# Patient Record
Sex: FEMALE | Race: BLACK OR AFRICAN AMERICAN | NOT HISPANIC OR LATINO | URBAN - METROPOLITAN AREA
[De-identification: names, ages, dates, MRNs, and addresses within clinical notes are randomized per-mention and may not be internally consistent; named-entity substitution may affect disease eponyms.]

---

## 2019-08-29 ENCOUNTER — EMERGENCY (EMERGENCY)
Facility: HOSPITAL | Age: 34
LOS: 1 days | Discharge: HOME | End: 2019-08-29
Attending: EMERGENCY MEDICINE | Admitting: EMERGENCY MEDICINE
Payer: COMMERCIAL

## 2019-08-29 VITALS
HEART RATE: 60 BPM | TEMPERATURE: 98 F | OXYGEN SATURATION: 100 % | RESPIRATION RATE: 16 BRPM | DIASTOLIC BLOOD PRESSURE: 85 MMHG | SYSTOLIC BLOOD PRESSURE: 130 MMHG

## 2019-08-29 VITALS
WEIGHT: 273.15 LBS | OXYGEN SATURATION: 100 % | TEMPERATURE: 98 F | HEART RATE: 61 BPM | SYSTOLIC BLOOD PRESSURE: 108 MMHG | DIASTOLIC BLOOD PRESSURE: 68 MMHG | RESPIRATION RATE: 16 BRPM | HEIGHT: 62 IN

## 2019-08-29 DIAGNOSIS — R11.0 NAUSEA: ICD-10-CM

## 2019-08-29 DIAGNOSIS — Z90.721 ACQUIRED ABSENCE OF OVARIES, UNILATERAL: ICD-10-CM

## 2019-08-29 DIAGNOSIS — R10.10 UPPER ABDOMINAL PAIN, UNSPECIFIED: ICD-10-CM

## 2019-08-29 DIAGNOSIS — Z90.79 ACQUIRED ABSENCE OF OTHER GENITAL ORGAN(S): Chronic | ICD-10-CM

## 2019-08-29 LAB
ALBUMIN SERPL ELPH-MCNC: 3.7 G/DL — SIGNIFICANT CHANGE UP (ref 3.3–5)
ALP SERPL-CCNC: 75 U/L — SIGNIFICANT CHANGE UP (ref 40–120)
ALT FLD-CCNC: 10 U/L — SIGNIFICANT CHANGE UP (ref 10–45)
ANION GAP SERPL CALC-SCNC: 9 MMOL/L — SIGNIFICANT CHANGE UP (ref 5–17)
APPEARANCE UR: CLEAR — SIGNIFICANT CHANGE UP
AST SERPL-CCNC: 18 U/L — SIGNIFICANT CHANGE UP (ref 10–40)
BASOPHILS # BLD AUTO: 0.02 K/UL — SIGNIFICANT CHANGE UP (ref 0–0.2)
BASOPHILS NFR BLD AUTO: 0.4 % — SIGNIFICANT CHANGE UP (ref 0–2)
BILIRUB SERPL-MCNC: 0.2 MG/DL — SIGNIFICANT CHANGE UP (ref 0.2–1.2)
BILIRUB UR-MCNC: NEGATIVE — SIGNIFICANT CHANGE UP
BUN SERPL-MCNC: 11 MG/DL — SIGNIFICANT CHANGE UP (ref 7–23)
CALCIUM SERPL-MCNC: 8.8 MG/DL — SIGNIFICANT CHANGE UP (ref 8.4–10.5)
CHLORIDE SERPL-SCNC: 108 MMOL/L — SIGNIFICANT CHANGE UP (ref 96–108)
CO2 SERPL-SCNC: 24 MMOL/L — SIGNIFICANT CHANGE UP (ref 22–31)
COLOR SPEC: YELLOW — SIGNIFICANT CHANGE UP
CREAT SERPL-MCNC: 0.78 MG/DL — SIGNIFICANT CHANGE UP (ref 0.5–1.3)
DIFF PNL FLD: ABNORMAL
EOSINOPHIL # BLD AUTO: 0.13 K/UL — SIGNIFICANT CHANGE UP (ref 0–0.5)
EOSINOPHIL NFR BLD AUTO: 2.3 % — SIGNIFICANT CHANGE UP (ref 0–6)
GLUCOSE SERPL-MCNC: 79 MG/DL — SIGNIFICANT CHANGE UP (ref 70–99)
GLUCOSE UR QL: NEGATIVE — SIGNIFICANT CHANGE UP
HCT VFR BLD CALC: 33.5 % — LOW (ref 34.5–45)
HGB BLD-MCNC: 9.9 G/DL — LOW (ref 11.5–15.5)
IMM GRANULOCYTES NFR BLD AUTO: 0.2 % — SIGNIFICANT CHANGE UP (ref 0–1.5)
KETONES UR-MCNC: NEGATIVE — SIGNIFICANT CHANGE UP
LEUKOCYTE ESTERASE UR-ACNC: NEGATIVE — SIGNIFICANT CHANGE UP
LIDOCAIN IGE QN: 33 U/L — SIGNIFICANT CHANGE UP (ref 7–60)
LYMPHOCYTES # BLD AUTO: 1.78 K/UL — SIGNIFICANT CHANGE UP (ref 1–3.3)
LYMPHOCYTES # BLD AUTO: 31.5 % — SIGNIFICANT CHANGE UP (ref 13–44)
MCHC RBC-ENTMCNC: 23.5 PG — LOW (ref 27–34)
MCHC RBC-ENTMCNC: 29.6 GM/DL — LOW (ref 32–36)
MCV RBC AUTO: 79.4 FL — LOW (ref 80–100)
MONOCYTES # BLD AUTO: 0.5 K/UL — SIGNIFICANT CHANGE UP (ref 0–0.9)
MONOCYTES NFR BLD AUTO: 8.8 % — SIGNIFICANT CHANGE UP (ref 2–14)
NEUTROPHILS # BLD AUTO: 3.21 K/UL — SIGNIFICANT CHANGE UP (ref 1.8–7.4)
NEUTROPHILS NFR BLD AUTO: 56.8 % — SIGNIFICANT CHANGE UP (ref 43–77)
NITRITE UR-MCNC: NEGATIVE — SIGNIFICANT CHANGE UP
NRBC # BLD: 0 /100 WBCS — SIGNIFICANT CHANGE UP (ref 0–0)
PH UR: 5.5 — SIGNIFICANT CHANGE UP (ref 5–8)
PLATELET # BLD AUTO: 375 K/UL — SIGNIFICANT CHANGE UP (ref 150–400)
POTASSIUM SERPL-MCNC: 4.6 MMOL/L — SIGNIFICANT CHANGE UP (ref 3.5–5.3)
POTASSIUM SERPL-SCNC: 4.6 MMOL/L — SIGNIFICANT CHANGE UP (ref 3.5–5.3)
PROT SERPL-MCNC: 7.5 G/DL — SIGNIFICANT CHANGE UP (ref 6–8.3)
PROT UR-MCNC: NEGATIVE MG/DL — SIGNIFICANT CHANGE UP
RBC # BLD: 4.22 M/UL — SIGNIFICANT CHANGE UP (ref 3.8–5.2)
RBC # FLD: 17 % — HIGH (ref 10.3–14.5)
SODIUM SERPL-SCNC: 141 MMOL/L — SIGNIFICANT CHANGE UP (ref 135–145)
SP GR SPEC: 1.02 — SIGNIFICANT CHANGE UP (ref 1–1.03)
UROBILINOGEN FLD QL: 0.2 E.U./DL — SIGNIFICANT CHANGE UP
WBC # BLD: 5.65 K/UL — SIGNIFICANT CHANGE UP (ref 3.8–10.5)
WBC # FLD AUTO: 5.65 K/UL — SIGNIFICANT CHANGE UP (ref 3.8–10.5)

## 2019-08-29 PROCEDURE — 36415 COLL VENOUS BLD VENIPUNCTURE: CPT

## 2019-08-29 PROCEDURE — 81001 URINALYSIS AUTO W/SCOPE: CPT

## 2019-08-29 PROCEDURE — 85025 COMPLETE CBC W/AUTO DIFF WBC: CPT

## 2019-08-29 PROCEDURE — 80053 COMPREHEN METABOLIC PANEL: CPT

## 2019-08-29 PROCEDURE — 83690 ASSAY OF LIPASE: CPT

## 2019-08-29 PROCEDURE — 99284 EMERGENCY DEPT VISIT MOD MDM: CPT

## 2019-08-29 PROCEDURE — 99283 EMERGENCY DEPT VISIT LOW MDM: CPT

## 2019-08-29 RX ORDER — ONDANSETRON 8 MG/1
4 TABLET, FILM COATED ORAL ONCE
Refills: 0 | Status: COMPLETED | OUTPATIENT
Start: 2019-08-29 | End: 2019-08-29

## 2019-08-29 RX ORDER — FAMOTIDINE 10 MG/ML
20 INJECTION INTRAVENOUS ONCE
Refills: 0 | Status: COMPLETED | OUTPATIENT
Start: 2019-08-29 | End: 2019-08-29

## 2019-08-29 RX ORDER — FAMOTIDINE 10 MG/ML
1 INJECTION INTRAVENOUS
Qty: 30 | Refills: 0
Start: 2019-08-29 | End: 2019-09-27

## 2019-08-29 RX ORDER — LIDOCAINE 4 G/100G
10 CREAM TOPICAL ONCE
Refills: 0 | Status: COMPLETED | OUTPATIENT
Start: 2019-08-29 | End: 2019-08-29

## 2019-08-29 RX ADMIN — ONDANSETRON 4 MILLIGRAM(S): 8 TABLET, FILM COATED ORAL at 13:08

## 2019-08-29 RX ADMIN — Medication 30 MILLILITER(S): at 13:08

## 2019-08-29 RX ADMIN — LIDOCAINE 10 MILLILITER(S): 4 CREAM TOPICAL at 13:08

## 2019-08-29 RX ADMIN — FAMOTIDINE 20 MILLIGRAM(S): 10 INJECTION INTRAVENOUS at 13:08

## 2019-08-29 NOTE — ED PROVIDER NOTE - CARDIAC, MLM
Assumed care of pt. A/Ox4. PIV in right hand 20g, saline locked and flushing well. Regular diet, dysphagia 3 with thins. BM 5/10. 2 assist due to hypotension. Bed alarm on. POC [facility] v. Home. Call light in reach, bed in low position, will continue hourly rounding.    Normal rate, regular rhythm.  Heart sounds S1, S2.  No murmurs, rubs or gallops.

## 2019-08-29 NOTE — ED PROVIDER NOTE - PHYSICAL EXAMINATION
General: Patient is well developed and well nourised. Patient is alert and oriented to person, place and date. Patient is laying comfortably in stretcher and appears in no acute distress.  HEENT: Head is normocephalic and atraumatic. Pupils are equal, round and reactive. Extraocular movements intact. No evidence of nystagmus, conjunctival injection, or scleral icterus. External ears symmetric without evidence of discharge.  Nose is symmetric, non-tender, patent without evidence of discharge. Teeth in good repair. Uvula midline.   Neck: Supple with no evidence of lymphadenopathy.  Full range of motion.  Heart: Regular rate and rhythm. No murmurs, rubs or gallops.   Lungs: Clear to auscultation bilaterally with equal chest expansion. No note of wheezes, rhonchi, rales. Equal chest expansion. No note of retractions.  Abdomen: ttp epigastric region. Bowel sounds present in all four quadrants. Soft, non-tender, non-distended without signs of masses, rebound or guarding. No note of hepatosplenomegaly. No CVA tenderness bilaterally. Negative Munoz sign. No pain present over McBurney's point.  Skin: Warm, dry and intact without evidence of rashes, bruising, pallor, jaundice or cyanosis.   Psych: Mood and affect appropriate.

## 2019-08-29 NOTE — ED PROVIDER NOTE - NSFOLLOWUPINSTRUCTIONS_ED_ALL_ED_FT
please eat bland foods- avoid spicy foods and red sauces    please take famotidine and maalox (over the counter medications)for your abdominal pain    please follow up with your primary care doctor            EPIGASTRIC PAIN - General Information     Epigastric Pain    WHAT YOU NEED TO KNOW:    What do I need to know about epigastric pain? Epigastric pain is felt in the middle of the upper abdomen, between the ribs and the bellybutton. The pain may be mild or severe. Pain may spread from or to another part of your body. Epigastric pain may be a sign of a serious health problem that needs to be treated.     What causes epigastric pain? The cause of your pain may not be known. The following are common causes:     Inflammation of your stomach, liver, pancreas, or intestines      Heart problems, such as a heart attack      Digestion problems, such as indigestion, GERD, or lactose intolerance      Medical conditions, such as an ulcer, a hernia, irritable bowel syndrome (IBS), or cancer      A blockage in your bowels or gallbladder      A bladder infection      An injury or previous surgery in your abdomen    What other signs and symptoms may I have with epigastric pain? Signs and symptoms will depend on what is causing your pain.    Nausea, vomiting, bloating, constipation, or diarrhea      Loss of appetite, weight loss, feeling of fullness as you start to eat      Movement relieves the pain or makes it worse, or only certain positions are comfortable      Pain when you eat, or pain that is relieved when you eat or have a bowel movement      Sore throat or a hoarse voice    How is epigastric pain diagnosed and treated? Your healthcare provider will feel your abdomen to see if it is tender or rigid. He may change or stop any medicine you are taking that is causing your pain. Your pain may go away without treatment, or you may need any of the following:     Medicines may be given to treat pain or stop vomiting. You may also need medicines to reduce or control stomach acid, or treat an infection.      Blood or urine tests may show problems such as infection or inflammation. The tests may also show how well your liver is working.      An x-ray is used to check your kidneys and bladder.      An ultrasound is used to check your gallbladder for stones or other blockage.      A bowel movement sample may be tested for blood.     How can I manage my symptoms?     Keep a record of your symptoms. Include when the pain starts, how long it lasts, and if it is sharp or dull. Also include any foods you ate or activities you did before the pain started. Keep track of anything that helped the pain.       Eat a variety of healthy foods. Healthy foods include fruits, vegetables, whole-grain breads, low-fat dairy products, beans, lean meats, and fish. Ask if you need to be on a special diet. Certain foods may cause your pain, such as alcohol or foods that are high in fat. You may need to eat smaller meals and to eat more often than usual.      Drink liquids as directed. Ask how much liquid to drink each day and which liquids are best for you. Do not have drinks that contain alcohol or caffeine.    Call 911 for any of the following:     You have any of the following signs of a heart attack:   Squeezing, pressure, or pain in your chest      You may also have any of the following:   Discomfort or pain in your back, neck, jaw, stomach, or arm      Shortness of breath      Nausea or vomiting      Lightheadedness or a sudden cold sweat      You have severe pain that radiates to your jaw or back.    When should I seek immediate care?     You have severe pain that starts suddenly and quickly gets worse.      You cannot have a bowel movement and are vomiting.      You vomit or cough up blood.      You see blood in your urine or bowel movement.      You feel drowsy and your breathing is slower than usual.    When should I contact my healthcare provider?     You have a fever or chills.      You have yellowing of your skin or the whites of your eyes.      You vomit often or several times in a row.       You lose weight without trying.      You have symptoms for longer than 2 weeks.      You have questions or concerns about your condition or care.    CARE AGREEMENT:    You have the right to help plan your care. Learn about your health condition and how it may be treated. Discuss treatment options with your healthcare providers to decide what care you want to receive. You always have the right to refuse treatment.        © Copyright [employer] 2019 All illustrations and images included in CareNotes are the copyrighted property of [employer] or [employer].      back to top                      © Copyright [employer] 2019

## 2019-08-29 NOTE — ED ADULT NURSE NOTE - OBJECTIVE STATEMENT
Pt. c/o constant upper abd pain w/ on/off nausea x 1 month, worsening x 2 days. Pt. describes pain as "burning" "cramping". Denies any vomiting or diarrhea, last BM last night was regular. Pt. also reports passing less gas. Denies fever, chills, body aches. Denies urinary Sx. Currently on menstrual cycle. Pt. last took naproxen yesterday (for uterine fibroids) w/ no abd pain relief. Pt. also reports chronic b/l knee pain, ambulating independently with steady gait.

## 2019-08-29 NOTE — ED PROVIDER NOTE - ATTENDING CONTRIBUTION TO CARE
34F no PMH p/w fluctuating upper abd pain x1mo, minimal occasional nausea, no other systemic symptoms. Vitals wnl, exam as above.   ddx: Likely GERD/gastritis/PUD less likely pancreatitis or cholelithiasis. clinically not cholecystitis or cardiac/pulm origin.  Basic labs, symptom control, reassess.

## 2019-08-29 NOTE — ED PROVIDER NOTE - PATIENT PORTAL LINK FT
You can access the FollowMyHealth Patient Portal offered by [facility] by registering at the following website: [email or web address]. By joining [employer]’s FollowMyHealth portal, you will also be able to view your health information using other applications (apps) compatible with our system.

## 2019-08-29 NOTE — ED PROVIDER NOTE - CLINICAL SUMMARY MEDICAL DECISION MAKING FREE TEXT BOX
35 y/o female presents to the ED with 1 month waxing and waning abd pain. No vomit, fever, chills, nausea, diarrhea. PE: pt appears well non toxic, mild tenderness to epigastric region. Will obtain labs and meds 33 y/o female presents to the ED with 1 month waxing and waning abd pain. No vomit, fever, chills, nausea, diarrhea. PE: pt appears well non toxic, mild tenderness to epigastric region. Will obtain labs and meds. re-valuation patient states that she is feeling better, abdomen is soft. encouraged maalox and famotidine and follow up with pmd. At this time, the evidence for any other entities in the differential is insufficient to justify any further testing. This was discussed and explained to the patient. It was advised that persistent or worsening symptoms would require further evaluation. This was discussed with the patient and family using shared decision making. ED evaluation and management discussed with the patient and family (if available) in detail.  Close PMD follow up encouraged.  Strict ED return instructions discussed in detail and patient given the opportunity to ask any questions about their discharge diagnosis and instructions. Patient verbalized understanding. Patient is agreeable to plan.

## 2019-08-29 NOTE — ED PROVIDER NOTE - PROGRESS NOTE DETAILS
Klepfish: labs grossly wnl, pain improving, abd soft ntnd, comfortable for dc, outpt pmd g/i f/u. Klepfish: slightly anemic, other labs grossly wnl, pain improving, abd soft ntnd, comfortable for dc, outpt pmd g/i f/u.

## 2019-08-29 NOTE — ED PROVIDER NOTE - OBJECTIVE STATEMENT
35 y/o F  presents with 1 month waxing and waning of upper abd pain. Pt states abd pain feels like it is "bubbling." Pt endorses nausea.  Pt does not endorse fever, chills, diarrhea, CP, cough, SOB, dysuria. All other ROS negative. 35 y/o F  presents with 1 month waxing and waning of upper abd pain. Pt states abd pain feels like it is "bubbling." Pt endorses nausea.  Pt does not endorse fever, chills, diarrhea, CP, cough, SOB, dysuria. All other ROS negative.  Klepfish: 34F no PMH p/w epigastric pain, x1mo, constant but waxing/waning, non-radiating, no exacerbating/alleviating factors, non-exertional. Slightly worse since yesterday so came to ED. Minimal occasional nausea, no vomit. Denies f/c, diarrhea, black/bloody stool, urinary complaints, focal weakness/numbness, lightheadedness, back pain, rashes, recent travel, recent antibiotics, sick contacts, SOB/CP, URI symptoms. Normal PO intake, normal BMs. 33 y/o F  presents with 1 month waxing and waning of upper abd pain. Pt states abd pain feels like it is "bubbling." Pt endorses nausea, no vomiting. states that she enjoys eating spicy foods unsure if this is worsening of symptoms. Pt does not endorse fever, chills, diarrhea, CP, cough, SOB, dysuria. All other ROS negative.    Klepfish: 34F no PMH p/w epigastric pain, x1mo, constant but waxing/waning, non-radiating, no exacerbating/alleviating factors, non-exertional. Slightly worse since yesterday so came to ED. Minimal occasional nausea, no vomit. Denies f/c, diarrhea, black/bloody stool, urinary complaints, focal weakness/numbness, lightheadedness, back pain, rashes, recent travel, recent antibiotics, sick contacts, SOB/CP, URI symptoms. Normal PO intake, normal BMs.

## 2021-07-06 ENCOUNTER — EMERGENCY (EMERGENCY)
Facility: HOSPITAL | Age: 36
LOS: 1 days | Discharge: HOME | End: 2021-07-06
Attending: STUDENT IN AN ORGANIZED HEALTH CARE EDUCATION/TRAINING PROGRAM | Admitting: STUDENT IN AN ORGANIZED HEALTH CARE EDUCATION/TRAINING PROGRAM
Payer: MEDICAID

## 2021-07-06 VITALS
HEART RATE: 61 BPM | SYSTOLIC BLOOD PRESSURE: 125 MMHG | RESPIRATION RATE: 16 BRPM | TEMPERATURE: 98 F | DIASTOLIC BLOOD PRESSURE: 78 MMHG | OXYGEN SATURATION: 100 %

## 2021-07-06 VITALS
TEMPERATURE: 99 F | DIASTOLIC BLOOD PRESSURE: 83 MMHG | OXYGEN SATURATION: 98 % | RESPIRATION RATE: 16 BRPM | SYSTOLIC BLOOD PRESSURE: 157 MMHG | HEART RATE: 67 BPM

## 2021-07-06 DIAGNOSIS — Z90.79 ACQUIRED ABSENCE OF OTHER GENITAL ORGAN(S): Chronic | ICD-10-CM

## 2021-07-06 LAB
ALBUMIN SERPL ELPH-MCNC: 3.7 G/DL — SIGNIFICANT CHANGE UP (ref 3.3–5)
ALP SERPL-CCNC: 76 U/L — SIGNIFICANT CHANGE UP (ref 40–120)
ALT FLD-CCNC: 8 U/L — SIGNIFICANT CHANGE UP (ref 4–33)
ANION GAP SERPL CALC-SCNC: 9 MMOL/L — SIGNIFICANT CHANGE UP (ref 7–14)
APPEARANCE UR: ABNORMAL
AST SERPL-CCNC: 14 U/L — SIGNIFICANT CHANGE UP (ref 4–32)
BASE EXCESS BLDV CALC-SCNC: 0.2 MMOL/L — SIGNIFICANT CHANGE UP (ref -3–2)
BASOPHILS # BLD AUTO: 0.03 K/UL — SIGNIFICANT CHANGE UP (ref 0–0.2)
BASOPHILS NFR BLD AUTO: 0.5 % — SIGNIFICANT CHANGE UP (ref 0–2)
BILIRUB SERPL-MCNC: 0.3 MG/DL — SIGNIFICANT CHANGE UP (ref 0.2–1.2)
BILIRUB UR-MCNC: NEGATIVE — SIGNIFICANT CHANGE UP
BLOOD GAS VENOUS - CREATININE: SIGNIFICANT CHANGE UP MG/DL (ref 0.5–1.3)
BLOOD GAS VENOUS COMPREHENSIVE RESULT: SIGNIFICANT CHANGE UP
BUN SERPL-MCNC: 13 MG/DL — SIGNIFICANT CHANGE UP (ref 7–23)
CALCIUM SERPL-MCNC: 8.8 MG/DL — SIGNIFICANT CHANGE UP (ref 8.4–10.5)
CHLORIDE BLDV-SCNC: 111 MMOL/L — HIGH (ref 96–108)
CHLORIDE SERPL-SCNC: 108 MMOL/L — HIGH (ref 98–107)
CO2 SERPL-SCNC: 21 MMOL/L — LOW (ref 22–31)
COLOR SPEC: SIGNIFICANT CHANGE UP
CREAT SERPL-MCNC: 0.84 MG/DL — SIGNIFICANT CHANGE UP (ref 0.5–1.3)
DIFF PNL FLD: ABNORMAL
EOSINOPHIL # BLD AUTO: 0.16 K/UL — SIGNIFICANT CHANGE UP (ref 0–0.5)
EOSINOPHIL NFR BLD AUTO: 2.7 % — SIGNIFICANT CHANGE UP (ref 0–6)
EPI CELLS # UR: SIGNIFICANT CHANGE UP /HPF (ref 0–5)
GAS PNL BLDV: 141 MMOL/L — SIGNIFICANT CHANGE UP (ref 136–146)
GLUCOSE BLDV-MCNC: 81 MG/DL — SIGNIFICANT CHANGE UP (ref 70–99)
GLUCOSE SERPL-MCNC: 82 MG/DL — SIGNIFICANT CHANGE UP (ref 70–99)
GLUCOSE UR QL: NEGATIVE — SIGNIFICANT CHANGE UP
HCG SERPL-ACNC: <5 MIU/ML — SIGNIFICANT CHANGE UP
HCO3 BLDV-SCNC: 24 MMOL/L — SIGNIFICANT CHANGE UP (ref 20–27)
HCT VFR BLD CALC: 35.9 % — SIGNIFICANT CHANGE UP (ref 34.5–45)
HCT VFR BLDA CALC: 33.7 % — LOW (ref 34.5–46.5)
HGB BLD CALC-MCNC: 10.9 G/DL — LOW (ref 11.5–15.5)
HGB BLD-MCNC: 11.5 G/DL — SIGNIFICANT CHANGE UP (ref 11.5–15.5)
HIV 1+2 AB+HIV1 P24 AG SERPL QL IA: SIGNIFICANT CHANGE UP
IANC: 3.57 K/UL — SIGNIFICANT CHANGE UP (ref 1.5–8.5)
IMM GRANULOCYTES NFR BLD AUTO: 0.3 % — SIGNIFICANT CHANGE UP (ref 0–1.5)
KETONES UR-MCNC: NEGATIVE — SIGNIFICANT CHANGE UP
LACTATE BLDV-MCNC: 1.3 MMOL/L — SIGNIFICANT CHANGE UP (ref 0.5–2)
LEUKOCYTE ESTERASE UR-ACNC: NEGATIVE — SIGNIFICANT CHANGE UP
LIDOCAIN IGE QN: 81 U/L — HIGH (ref 7–60)
LYMPHOCYTES # BLD AUTO: 1.51 K/UL — SIGNIFICANT CHANGE UP (ref 1–3.3)
LYMPHOCYTES # BLD AUTO: 25.4 % — SIGNIFICANT CHANGE UP (ref 13–44)
MCHC RBC-ENTMCNC: 27.9 PG — SIGNIFICANT CHANGE UP (ref 27–34)
MCHC RBC-ENTMCNC: 32 GM/DL — SIGNIFICANT CHANGE UP (ref 32–36)
MCV RBC AUTO: 87.1 FL — SIGNIFICANT CHANGE UP (ref 80–100)
MONOCYTES # BLD AUTO: 0.65 K/UL — SIGNIFICANT CHANGE UP (ref 0–0.9)
MONOCYTES NFR BLD AUTO: 10.9 % — SIGNIFICANT CHANGE UP (ref 2–14)
NEUTROPHILS # BLD AUTO: 3.57 K/UL — SIGNIFICANT CHANGE UP (ref 1.8–7.4)
NEUTROPHILS NFR BLD AUTO: 60.2 % — SIGNIFICANT CHANGE UP (ref 43–77)
NITRITE UR-MCNC: NEGATIVE — SIGNIFICANT CHANGE UP
NRBC # BLD: 0 /100 WBCS — SIGNIFICANT CHANGE UP
NRBC # FLD: 0 K/UL — SIGNIFICANT CHANGE UP
PCO2 BLDV: 52 MMHG — HIGH (ref 41–51)
PH BLDV: 7.32 — SIGNIFICANT CHANGE UP (ref 7.32–7.43)
PH UR: 6 — SIGNIFICANT CHANGE UP (ref 5–8)
PLATELET # BLD AUTO: 317 K/UL — SIGNIFICANT CHANGE UP (ref 150–400)
PO2 BLDV: 41 MMHG — HIGH (ref 35–40)
POTASSIUM BLDV-SCNC: 4 MMOL/L — SIGNIFICANT CHANGE UP (ref 3.4–4.5)
POTASSIUM SERPL-MCNC: 4.3 MMOL/L — SIGNIFICANT CHANGE UP (ref 3.5–5.3)
POTASSIUM SERPL-SCNC: 4.3 MMOL/L — SIGNIFICANT CHANGE UP (ref 3.5–5.3)
PROT SERPL-MCNC: 7.1 G/DL — SIGNIFICANT CHANGE UP (ref 6–8.3)
PROT UR-MCNC: ABNORMAL
RBC # BLD: 4.12 M/UL — SIGNIFICANT CHANGE UP (ref 3.8–5.2)
RBC # FLD: 13.2 % — SIGNIFICANT CHANGE UP (ref 10.3–14.5)
RBC CASTS # UR COMP ASSIST: SIGNIFICANT CHANGE UP /HPF (ref 0–4)
SAO2 % BLDV: 73.2 % — SIGNIFICANT CHANGE UP (ref 60–85)
SODIUM SERPL-SCNC: 138 MMOL/L — SIGNIFICANT CHANGE UP (ref 135–145)
SP GR SPEC: >1.05 (ref 1.01–1.02)
UROBILINOGEN FLD QL: SIGNIFICANT CHANGE UP
WBC # BLD: 5.94 K/UL — SIGNIFICANT CHANGE UP (ref 3.8–10.5)
WBC # FLD AUTO: 5.94 K/UL — SIGNIFICANT CHANGE UP (ref 3.8–10.5)

## 2021-07-06 PROCEDURE — 71046 X-RAY EXAM CHEST 2 VIEWS: CPT | Mod: 26

## 2021-07-06 PROCEDURE — 74177 CT ABD & PELVIS W/CONTRAST: CPT | Mod: 26

## 2021-07-06 PROCEDURE — 99285 EMERGENCY DEPT VISIT HI MDM: CPT

## 2021-07-06 PROCEDURE — 76830 TRANSVAGINAL US NON-OB: CPT | Mod: 26

## 2021-07-06 RX ORDER — MORPHINE SULFATE 50 MG/1
4 CAPSULE, EXTENDED RELEASE ORAL ONCE
Refills: 0 | Status: DISCONTINUED | OUTPATIENT
Start: 2021-07-06 | End: 2021-07-06

## 2021-07-06 RX ORDER — OXYCODONE AND ACETAMINOPHEN 5; 325 MG/1; MG/1
1 TABLET ORAL ONCE
Refills: 0 | Status: DISCONTINUED | OUTPATIENT
Start: 2021-07-06 | End: 2021-07-06

## 2021-07-06 RX ORDER — KETOROLAC TROMETHAMINE 30 MG/ML
15 SYRINGE (ML) INJECTION ONCE
Refills: 0 | Status: DISCONTINUED | OUTPATIENT
Start: 2021-07-06 | End: 2021-07-06

## 2021-07-06 RX ORDER — IBUPROFEN 200 MG
1 TABLET ORAL
Qty: 15 | Refills: 0
Start: 2021-07-06 | End: 2021-07-10

## 2021-07-06 RX ORDER — ACETAMINOPHEN 500 MG
1000 TABLET ORAL ONCE
Refills: 0 | Status: COMPLETED | OUTPATIENT
Start: 2021-07-06 | End: 2021-07-06

## 2021-07-06 RX ORDER — ONDANSETRON 8 MG/1
1 TABLET, FILM COATED ORAL
Qty: 12 | Refills: 0
Start: 2021-07-06 | End: 2021-07-08

## 2021-07-06 RX ADMIN — Medication 15 MILLIGRAM(S): at 15:22

## 2021-07-06 RX ADMIN — Medication 1000 MILLIGRAM(S): at 18:58

## 2021-07-06 RX ADMIN — OXYCODONE AND ACETAMINOPHEN 1 TABLET(S): 5; 325 TABLET ORAL at 22:32

## 2021-07-06 RX ADMIN — MORPHINE SULFATE 4 MILLIGRAM(S): 50 CAPSULE, EXTENDED RELEASE ORAL at 14:21

## 2021-07-06 RX ADMIN — MORPHINE SULFATE 4 MILLIGRAM(S): 50 CAPSULE, EXTENDED RELEASE ORAL at 13:30

## 2021-07-06 RX ADMIN — Medication 400 MILLIGRAM(S): at 18:26

## 2021-07-06 RX ADMIN — Medication 1000 MILLIGRAM(S): at 19:01

## 2021-07-06 NOTE — ED ADULT TRIAGE NOTE - CHIEF COMPLAINT QUOTE
pt here for lower abdominal pain/pelvic pain, nausea, and vomiting x 2 days. LPM 7/5. pt appears uncomfortable

## 2021-07-06 NOTE — ED PROVIDER NOTE - PATIENT PORTAL LINK FT
You can access the FollowMyHealth Patient Portal offered by [facility] by registering at the following website: [email or web address]. By joining [employer]’s FollowMyHealth portal, you will also be able to view your health information using other applications (apps) compatible with our system.

## 2021-07-06 NOTE — ED ADULT NURSE NOTE - OBJECTIVE STATEMENT
patient Alert & ox3,pt here for lower abdominal pain/pelvic pain, nausea, and vomiting x 2 days. LPM 7/5. pt appears uncomfortable pt . evaluated by MD. Placed 20g angiocath Lt. AC., labs drawn and sent. Due meds given .patient will be waiting for results, further evaluation and disposition.  made comfortable.will continue to monitor.

## 2021-07-06 NOTE — ED PROVIDER NOTE - PROGRESS NOTE DETAILS
VIOLETTE Victor: Pt reassessed. States pain is coming back. Morphine ordered. Called US to inquire about the long wait, state they were waiting for the CT scan first, told US that she may get the US first VIOLETTE Victor: Received call from radiology stating IV contrast had extravasated. Awaiting pt to return from CT to apply warm compress, extremity elevation and assessment. VIOLETTE Victor: Pt not in RW, not outside CT room. Likely pt in US, will assess pt upon return VIOLETTE Victor: Pt neurovascularly intact. Pt still did not get an US, called US and they state transport is backed up. VIOLETTE Victor: Pt L arm neurovascularly intact. Pt still did not get an US, called US and they state transport is backed up. PA Grancaric: L arm neurovascularly intact. Pt feeling better.

## 2021-07-06 NOTE — ED PROVIDER NOTE - CLINICAL SUMMARY MEDICAL DECISION MAKING FREE TEXT BOX
35 y/o F R oophorectomy 2/2 multiple ovarian cysts c/o lower abd pain x last night. Constant,10/10, sharp. Pt reports she has painful menses, currently on her menses but never had pain this severe. Pt took 1000mg ibuprofen 1.5hrs prior to arrival. Pt also c/o midsternal sharp chest pain x 2 days, constant, +SOB. Does not typically get chest pain. Pt denies fever, vomiting, diarrhea. Is passing gas.    pt appears uncomfortable, vss, abd soft/nd/nt    r/o appy, torasion, acs  -TVUS, CTAP, labs, cxr, iv morphine

## 2021-07-06 NOTE — ED PROVIDER NOTE - NSFOLLOWUPINSTRUCTIONS_ED_ALL_ED_FT
Please follow up with your OB/GYN    Pelvic Pain, Female  Female pelvic pain can be caused by many different things and start from a variety of places. Pelvic pain refers to pain that is located in the lower half of the abdomen and between your hips. The pain may occur over a short period of time (acute) or may be reoccurring (chronic). The cause of pelvic pain may be related to disorders affecting the female reproductive organs (gynecologic), but it may also be related to the bladder, kidney stones, an intestinal complication, or muscle or skeletal problems. Getting help right away for pelvic pain is important, especially if there has been severe, sharp, or a sudden onset of unusual pain. It is also important to get help right away because some types of pelvic pain can be life threatening.     CAUSES  Below are only some of the causes of pelvic pain. The causes of pelvic pain can be in one of several categories.     Gynecologic.   Pelvic inflammatory disease.  Sexually transmitted infection.  Ovarian cyst or a twisted ovarian ligament (ovarian torsion).  Uterine lining that grows outside the uterus (endometriosis).  Fibroids, cysts, or tumors.  Ovulation.   Pregnancy.  Pregnancy that occurs outside the uterus (ectopic pregnancy).  Miscarriage.   Labor.  Abruption of the placenta or ruptured uterus.  Infection.  Uterine infection (endometritis).  Bladder infection.  Diverticulitis.  Miscarriage related to a uterine infection (septic ).  Bladder.  Inflammation of the bladder (cystitis).  Kidney stone(s).  Gastrointestinal.  Constipation.  Diverticulitis.  Neurologic.  Trauma.  Feeling pelvic pain because of mental or emotional causes (psychosomatic).   Cancers of the bowel or pelvis.     EVALUATION  Your caregiver will want to take a careful history of your concerns. This includes recent changes in your health, a careful gynecologic history of your periods (menses), and a sexual history. Obtaining your family history and medical history is also important. Your caregiver may suggest a pelvic exam. A pelvic exam will help identify the location and severity of the pain. It also helps in the evaluation of which organ system may be involved. In order to identify the cause of the pelvic pain and be properly treated, your caregiver may order tests. These tests may include:     A pregnancy test.  Pelvic ultrasonography.  An X-ray exam of the abdomen.  A urinalysis or evaluation of vaginal discharge.  Blood tests.     HOME CARE INSTRUCTIONS  Only take over-the-counter or prescription medicines for pain, discomfort, or fever as directed by your caregiver.    Rest as directed by your caregiver.    Eat a balanced diet.    Drink enough fluids to make your urine clear or pale yellow, or as directed.    Avoid sexual intercourse if it causes pain.    Apply warm or cold compresses to the lower abdomen depending on which one helps the pain.    Avoid stressful situations.    Keep a journal of your pelvic pain. Write down when it started, where the pain is located, and if there are things that seem to be associated with the pain, such as food or your menstrual cycle.  Follow up with your caregiver as directed.       SEEK MEDICAL CARE IF:  Your medicine does not help your pain.  You have abnormal vaginal discharge.     SEEK IMMEDIATE MEDICAL CARE IF:  You have heavy bleeding from the vagina.    Your pelvic pain increases.    You feel light-headed or faint.    You have chills.    You have pain with urination or blood in your urine.    You have uncontrolled diarrhea or vomiting.    You have a fever or persistent symptoms for more than 3 days.  You have a fever and your symptoms suddenly get worse.    You are being physically or sexually abused.

## 2021-07-06 NOTE — ED PROVIDER NOTE - GASTROINTESTINAL [+], MLM
Myron Mcmahon : 1950 [place] Secondary: Maninder Mckee 76 at [facility] [address] [place] [address], [place], [address] [place] [address] [phone]   [phone] OUTPATIENT PHYSICAL THERAPY:Daily Note and Discharge 2019 ICD-10: Treatment Diagnosis: M54.5 low back pain and M51.36 other intervertebral disc degeneration, lumbar region and M54.16 radiculopathy , lumbar region and R26.89 other abnormalities of gait and mobility Precautions: FALLS PRECAUTIONS-VERY WEAK QUADS -INCREASED PAIN WITH TRUNK FLEXION/COLD MAKES PAIN WORSE Allergies: Patient has no known allergies. MD Orders: evaluate and treat  MEDICAL/REFERRING DIAGNOSIS: 
Other intervertebral disc degeneration, lumbar region [M51.36] Radiculopathy, lumbar region [M54.16] Low back pain [M54.5] DATE OF ONSET: on and off x 6-7 years -worse the last year REFERRING PHYSICIAN: Qing Charles PA 
RETURN PHYSICIAN APPOINTMENT:19 INITIAL ASSESSMENT:  Mr. Nicole Thomas is a 77 y.o. male presenting to physical therapy with complaints of insidious B low back pain with radicular symptoms down L leg to L foot with incidences of L leg going numb particularly after long standing  -patient ambulates independently with minimal antalgic gait -rounded shoulders with forward head on neck posture and flattened lumbar curve-posteriorly rotated L innominant is noted-very tight B lumbar paraspinals with increased tightness on R side-point tender at L L4-S1 region -tight gluteus medius and piriformis musculature  - -decreased trunk mobility with increased pain in flexion/extension/B sidebending -pain level is 5/10 but can get as high as 7-8/10-history of fallen secondary to weak/numb L LE per patient -- very weak quads and hamstrings with increased pain/cramping with muscle testing-recent history of xrays and mri per patient  (mri performed on 19 per patient ) -very good candidate for skilled physical therapy to include manual therapy/ pain modalities as needed/therapeutic exercises and hot/cold modalities and proper  training Patient has been seen for 9 visits of low back rehab with very good progress from 2-19-19 to 3-26-19-decreased lumbar and gluteus medius spasm with no radicular symptoms-pain level is 0/10 after therapy and  steroid injection -independent ambulation with minimal antalgic gait-increased LE strength to 4- to 4/5 -independent with home exercise program-trunk range of motion is wfl with less tight hamstrings -overall excellent  progress-low back pain outcome measure score is improved from 11/50 to 0/50 -goals met-patient is now able to work at [employer], 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 Decreased Strength 2. Decreased ADL/Functional Activities 3. Decreased Transfer Abilities 4. Decreased Ambulation Ability/Technique 5. Decreased Balance 6. Increased Pain 7. Decreased Flexibility/Joint Mobility INTERVENTIONS PLANNED: 
1. Electrical Stimulation 2. Heat 3. Home Exercise Program (HEP) 4. Manual Therapy 5. Therapeutic Exercise/Strengthening 6. Ultrasound (US)  
TREATMENT PLAN: 
Effective Dates: 2/19/2019 TO 4/20/2019 (60 days). Frequency/Duration: 2 times a week for 60 Days GOALS: (Goals have been discussed and agreed upon with patient.) Short-Term Functional Goals: Time Frame: 3-5-19 1. Low back pain is less than 5/10 at rest and less than 7-8/10 with standing/walking for greater than 2 hours -GOAL MET 2. Trunk flexion is improved by 10% to allow increased personal care ability/putting on shoes and socks -GOAL MET 3. Independent ambulation with minimal to no antalgic gait -GOAL MET Instruction in exercise program to allow increased ADLs. -GOAL MET 
 Discharge Goals: Time Frame: 4-20-19 1. Low back pain is 1-2/10 at rest and less than 3-4/10 with chores to allow increased home ADL ability and working in yard-GOAL MET 2. Trunk range of motion is at least 75% in all ranges to allow most personal care issues including washing and dressing and driving and helping at [employer]  -GOAL MET 3. Independent ambulation with no antalgic gait to allow walking community distances-GOAL MET 4. LE quad and hamstring strength is at least 3+ to 4-/5 to allow minimal falls/buckling risk -GOAL MET 5. Able to walk/stand for greater than 2 hours -GOAL MET 6. Outcome measure score is improved from 11/50 to 6/50 -GOAL MET-LATEST SCORE IS 0/50 7. Rehabilitation Potential For Stated Goals: Good Regarding Alfredito Martinesman's therapy, I certify that the treatment plan above will be carried out by a therapist or under their direction. Thank you for this referral, Salud Scott PT Referring Physician Signature: NAGA Lewis            Date The information in this section was collected on 2-19-19 (except where otherwise noted). HISTORY:  
History of Present Injury/Illness (Reason for Referral): Insidious B low back pain with radiculopathy down L leg -LE weakness with falls precautions Past Medical History/Comorbidities:  
Mr. Дмитрий Manning  has a past medical history of Acute ulcer of stomach, Arthritis, BCC (basal cell carcinoma of skin), BPH (benign prostatic hyperplasia), CAD (coronary artery disease), Dental disorder, Depression, Diabetes ([address] HLD (hyperlipidemia), HTN (hypertension), Melanoma ([address] MI (myocardial infarction) ([facility] and Prediabetes. Mr. Дмитрий Manning  has a past surgical history that includes hx cervical fusion; hx other surgical; hx other surgical (2014); hx hernia repair (Right); and pr cardiac surg procedure unlist (1997). Social History/Living Environment:  
  
Prior Level of Function/Work/Activity: Independent with home ADLs and walking Ambulatory/Rehab Services H2 Model Falls Risk Assessment Risk Factors: 
     (1)  Gender [Male] Ability to Rise from Chair: 
     (1)  Pushes up, successful in one attempt Falls Prevention Plan: No modifications necessary Total: (5 or greater = High Risk): 2  
 ©2010 [facility] [employer] . [facility] States Patent #1,[id number]. Federal Law prohibits the replication, distribution or use without written permission from [facility] [employer] Current Medications:   
  
Current Outpatient Medications:  
  metFORMIN (GLUCOPHAGE) 500 mg tablet, TAKE 1 TABLET BY MOUTH ONCE DAILY WITH BREAKFAST, Disp: 90 Tab, Rfl: 1 
  lisinopril (PRINIVIL, ZESTRIL) 20 mg tablet, Take 1 Tab by mouth daily. , Disp: 90 Tab, Rfl: 0 
  citalopram (CELEXA) 20 mg tablet, Take 1 Tab by mouth daily. , Disp: 90 Tab, Rfl: 0 
  varenicline (CHANTIX STARTER GAVIN) 0.5 mg (11)- 1 mg (42) DsPk, Take 1 mg by mouth two (2) times daily (after meals). , Disp: 1 Dose Pack, Rfl: 0 
  fenofibrate (LOFIBRA) 160 mg tablet, TAKE 1 TABLET BY MOUTH ONCE DAILY, Disp: 90 Tab, Rfl: 1 
  atorvastatin (LIPITOR) 80 mg tablet, TAKE 1 TABLET BY MOUTH ONCE DAILY AT BEDTIME, Disp: 90 Tab, Rfl: 1 
  traZODone (DESYREL) 50 mg tablet, Take 1 Tab by mouth nightly., Disp: 90 Tab, Rfl: 1 
  tamsulosin (FLOMAX) 0.4 mg capsule, TAKE ONE CAPSULE BY MOUTH ONCE DAILY, Disp: 90 Cap, Rfl: 3 
  amLODIPine (NORVASC) 10 mg tablet, TAKE ONE TABLET BY MOUTH ONCE DAILY, Disp: 90 Tab, Rfl: 3 
  diphenhydrAMINE-acetaminophen (TYLENOL PM EXTRA STRENGTH)  mg tab, Take 1 Tab by mouth nightly as needed. , Disp: , Rfl:  
  multivit-min-FA-lycopen-lutein (CENTRUM SILVER MEN) 300-600-300 mcg tab, Take 1 Tab by mouth nightly., Disp: , Rfl:  
  aspirin delayed-release 81 mg tablet, Take 81 mg by mouth nightly., Disp: , Rfl:   
Date Last Reviewed:  4/2/2019 Number of Personal Factors/Comorbidities that affect the Plan of Care: 
(patient has a history of melanoma, HTN, cervical fusion and diabetes) 1-2: MODERATE COMPLEXITY EXAMINATION:  
Observation/Orthostatic Postural Assessment:   
      less shoulders with forward head on neck posture -posteriorly rotated L innominant is resolved Palpation:   
     less tight R lumbar paraspinals and B gluteus medius and no longer  tender at L L4-S1 -tight B piriformis and tight hamstrings are less tight with no  cramping ROM:   
      LE range of motion is wfl  
 
Trunk range of motion- 
 
Flexion-85% with less hamstring tightness Extension -50 % with less pain B sidebending -80% with less  pain B rotation-90% Strength: Ankles are  4-/5 B quads and hamstrings are 4- to 4/5 B hip flexors are  4-/5 Special Tests:   
     Negative spring/compression/valsalva/stork-positive straight leg raise on L with tight hamstrings Neurological Screen: 
 
Radiating pain  Along L L5 pathway in L LE-patient complains of burning and numbness after extended standing -this is resolved with steroid injection Balance:   
    Intact Mental Status:   
      Alert and oriented Sensation:  
      Intact to light touch Body Structures Involved: 1. Bones 2. Joints 3. Muscles Body Functions Affected: 1. Sensory/Pain 2. Neuromusculoskeletal 
3. Movement Related Activities and Participation Affected: 1. Learning and Applying Knowledge 2. General Tasks and Demands 3. Mobility 4. Self Care 5. Domestic Life Number of elements (examined above) that affect the Plan of Care: 3: MODERATE COMPLEXITY CLINICAL PRESENTATION:  
Presentation: Evolving clinical presentation with changing clinical characteristics: MODERATE COMPLEXITY CLINICAL DECISION MAKING:  
Outcome Measure: Tool Used: Modified Oswestry Low Back Pain Questionnaire Score:  Initial: 11/50  Most Recent: 0/50 (Date: 4-2-19-- ) Interpretation of Score: Each section is scored on a 0-5 scale, 5 representing the greatest disability. The scores of each section are added together for a total score of 50. Medical Necessity: · DC to home exercise program 
Reason for Services/Other Comments: · DC to home program  
Use of outcome tool(s) and clinical judgement create a POC that gives a: 
(outcome measure illustrates up to 39% impairment ) Questionable prediction of patient's progress: MODERATE COMPLEXITY  
  
 
 
 
TREATMENT:  
(In addition to Assessment/Re-Assessment sessions the following treatments were rendered) Pre-treatment Symptoms/Complaints:  Patient reports no pain today and he feels well enough to be discharged -no pain in last 2-3 weeks Pain: Initial:  
Pain Intensity 1: 0 Pain Location 1: Back Pain Orientation 1: Left, Lower Pain Intervention(s) 1: Exercise  Post Session: 0/10 -decreased spasm -less tight hamstrings Therapeutic Exercise: ( ): x 35 minutes Exercises per grid below to improve mobility, strength, balance and coordination. Required minimal verbal cues to promote proper body alignment, promote proper body posture and promote proper body mechanics. Progressed resistance, range, repetitions and complexity of movement as indicated. Date: 
3-26-19 Date: 
4-2-19 Date: 
3-21-19 Activity/Exercise Parameters Parameters Parameters Nu step   X 12 minutes at level 3 Calf stretch  4 x 30 seconds on incline I T band stretch Knee to chest  4 x 30 seconds with towel   4 x 30 sec Hamstring stretch 4 x 30 seconds with belt  Strap 4 x 30 sec Piriformis stretch 4 x 30 seconds   5 x 30 sec Sit to stand  2 x 10 with no arm assist 2 x 10 with no arm assist   
Standing hip flexion X 15 to each LE with black band X 20 to each LE with 3 lbs Standing hip extension X 15 to each LE with black band X 20 to each LE with 3 lbs Standing hip abduction X 15 to each LE with black band X 20 to each LE with 3 lbs Seated hamstring curls with eccentric knee extension X 20 to each LE with green band X 20 to each LE with green band Abdominal bracing X 10 with 3 second hold Gluteal sets X 10 with 3 second hold Hip adduction 2 X 10 with theraball 2 x 10 with theraball Hip abduction 2 X 10 with black band 2 x 10 with black band Black 3 x 10 with TA Marching in place  X 20 to each LE with 3 lbs while standing   
steps  X 10 to each LE with 3 lbs and 12 inch step Gluteus medius -hip abduction/hip extension/foot int rot      
clamshells 2 x 10 to each LE with black band X 20 to each LE with black band reviewed Manual Therapy (      Soft tissue mobilization to left  lumbosacral region , left gluteus medius and piriformis in  right side lying to decrease tightness  X 15 minutes-effleurage and petrissage for tightness Therapeutic Modalities: for pain and edema Ultrasound x 10 minutes at 1.5 w/cm to L lumbar and L gluteus medius while in R sidelying position for tightness HEP: As above; handouts given to patient for all exercises. Treatment/Session Assessment:   
· Response to Treatment:  Very good progress - 0/10 pain -DC to home program 
· Compliance with Program/Exercises: compliant most of the time. · Recommendations/Intent for next treatment session: \"DC to home exercise program after 9 visits of therapy-0/10 pain-all goals met-pleasant gentleman to work with Total Treatment Duration: 60  minutes PT Patient Time In/Time Out Time In: 0900 Time Out: 1000 Javier Torres PT  
    
 
 
 ABDOMINAL PAIN/NAUSEA

## 2021-07-06 NOTE — ED PROVIDER NOTE - NSFOLLOWUPCLINICS_GEN_ALL_ED_FT
[facility]  Gynceology/OB  [address] [place] [address]  Phone: [phone]  Fax:   Follow Up Time: Routine

## 2021-07-06 NOTE — ED PROVIDER NOTE - ATTENDING CONTRIBUTION TO CARE
Hitesh Roberts DO:  patient seen and evaluated with the PA.  I was present for key portions of the History & Physical, and I agree with the Impression & Plan. 35 yo f s/p r oophorectomy (2013) (cysts), pw rlq pain. has had chronic sx, coincides w/ menses, currently on menses, however worsening pain last night, sharp, non radiating. also reports two day of midsternal chest pain/sob. denies trauma, new sexual partners, hx std. no other abd surgeries. denies n/v. passing gas, having bm. arrives hds, uncomfortable appearing, no skin changes. unlikely sbo or tubo ovarian abscess. low suspicion retained tissue from surgery. possible appy. labs, imaging, reassess.

## 2021-07-06 NOTE — ED PROVIDER NOTE - OBJECTIVE STATEMENT
35 y/o F R oophorectomy 2/2 multiple ovarian cysts c/o lower abd pain x last night. Constant,10/10, sharp. Pt reports she has painful menses, currently on her menses but never had pain this severe. Pt took 1000mg ibuprofen 1.5hrs prior to arrival. Pt also c/o midsternal sharp chest pain x 2 days, constant, +SOB. Does not typically get chest pain. Pt denies fever, vomiting, diarrhea. Is passing gas.

## 2021-07-07 ENCOUNTER — EMERGENCY (EMERGENCY)
Facility: HOSPITAL | Age: 36
LOS: 1 days | Discharge: HOME | End: 2021-07-07
Attending: EMERGENCY MEDICINE | Admitting: EMERGENCY MEDICINE
Payer: MEDICAID

## 2021-07-07 VITALS
RESPIRATION RATE: 17 BRPM | TEMPERATURE: 98 F | WEIGHT: 255.07 LBS | HEIGHT: 62 IN | SYSTOLIC BLOOD PRESSURE: 124 MMHG | OXYGEN SATURATION: 100 % | DIASTOLIC BLOOD PRESSURE: 93 MMHG | HEART RATE: 54 BPM

## 2021-07-07 VITALS
TEMPERATURE: 98 F | DIASTOLIC BLOOD PRESSURE: 69 MMHG | OXYGEN SATURATION: 100 % | HEART RATE: 62 BPM | SYSTOLIC BLOOD PRESSURE: 102 MMHG | RESPIRATION RATE: 18 BRPM

## 2021-07-07 DIAGNOSIS — Z90.79 ACQUIRED ABSENCE OF OTHER GENITAL ORGAN(S): Chronic | ICD-10-CM

## 2021-07-07 PROBLEM — D25.9 LEIOMYOMA OF UTERUS, UNSPECIFIED: Chronic | Status: ACTIVE | Noted: 2019-08-29

## 2021-07-07 PROBLEM — Z00.00 ENCOUNTER FOR PREVENTIVE HEALTH EXAMINATION: Status: ACTIVE | Noted: 2021-07-07

## 2021-07-07 LAB
ALBUMIN SERPL ELPH-MCNC: 3.2 G/DL — LOW (ref 3.3–5)
ALP SERPL-CCNC: 65 U/L — SIGNIFICANT CHANGE UP (ref 40–120)
ALT FLD-CCNC: 16 U/L — SIGNIFICANT CHANGE UP (ref 12–78)
ANION GAP SERPL CALC-SCNC: 3 MMOL/L — LOW (ref 5–17)
APPEARANCE UR: ABNORMAL
AST SERPL-CCNC: 13 U/L — LOW (ref 15–37)
BASOPHILS # BLD AUTO: 0.03 K/UL — SIGNIFICANT CHANGE UP (ref 0–0.2)
BASOPHILS NFR BLD AUTO: 0.5 % — SIGNIFICANT CHANGE UP (ref 0–2)
BILIRUB SERPL-MCNC: 0.5 MG/DL — SIGNIFICANT CHANGE UP (ref 0.2–1.2)
BILIRUB UR-MCNC: NEGATIVE — SIGNIFICANT CHANGE UP
BUN SERPL-MCNC: 10 MG/DL — SIGNIFICANT CHANGE UP (ref 7–23)
CALCIUM SERPL-MCNC: 8.6 MG/DL — SIGNIFICANT CHANGE UP (ref 8.5–10.1)
CHLORIDE SERPL-SCNC: 109 MMOL/L — HIGH (ref 96–108)
CO2 SERPL-SCNC: 29 MMOL/L — SIGNIFICANT CHANGE UP (ref 22–31)
COLOR SPEC: ABNORMAL
CREAT SERPL-MCNC: 0.92 MG/DL — SIGNIFICANT CHANGE UP (ref 0.5–1.3)
CULTURE RESULTS: SIGNIFICANT CHANGE UP
DIFF PNL FLD: ABNORMAL
EOSINOPHIL # BLD AUTO: 0.13 K/UL — SIGNIFICANT CHANGE UP (ref 0–0.5)
EOSINOPHIL NFR BLD AUTO: 2.2 % — SIGNIFICANT CHANGE UP (ref 0–6)
GLUCOSE SERPL-MCNC: 76 MG/DL — SIGNIFICANT CHANGE UP (ref 70–99)
GLUCOSE UR QL: NEGATIVE — SIGNIFICANT CHANGE UP
HCG SERPL-ACNC: <1 MIU/ML — SIGNIFICANT CHANGE UP
HCT VFR BLD CALC: 35.4 % — SIGNIFICANT CHANGE UP (ref 34.5–45)
HGB BLD-MCNC: 11.5 G/DL — SIGNIFICANT CHANGE UP (ref 11.5–15.5)
IMM GRANULOCYTES NFR BLD AUTO: 0.5 % — SIGNIFICANT CHANGE UP (ref 0–1.5)
KETONES UR-MCNC: NEGATIVE — SIGNIFICANT CHANGE UP
LEUKOCYTE ESTERASE UR-ACNC: ABNORMAL
LYMPHOCYTES # BLD AUTO: 1.8 K/UL — SIGNIFICANT CHANGE UP (ref 1–3.3)
LYMPHOCYTES # BLD AUTO: 30.8 % — SIGNIFICANT CHANGE UP (ref 13–44)
MCHC RBC-ENTMCNC: 28.5 PG — SIGNIFICANT CHANGE UP (ref 27–34)
MCHC RBC-ENTMCNC: 32.5 GM/DL — SIGNIFICANT CHANGE UP (ref 32–36)
MCV RBC AUTO: 87.6 FL — SIGNIFICANT CHANGE UP (ref 80–100)
MONOCYTES # BLD AUTO: 0.57 K/UL — SIGNIFICANT CHANGE UP (ref 0–0.9)
MONOCYTES NFR BLD AUTO: 9.8 % — SIGNIFICANT CHANGE UP (ref 2–14)
NEUTROPHILS # BLD AUTO: 3.28 K/UL — SIGNIFICANT CHANGE UP (ref 1.8–7.4)
NEUTROPHILS NFR BLD AUTO: 56.2 % — SIGNIFICANT CHANGE UP (ref 43–77)
NITRITE UR-MCNC: NEGATIVE — SIGNIFICANT CHANGE UP
NRBC # BLD: 0 /100 WBCS — SIGNIFICANT CHANGE UP (ref 0–0)
PH UR: 8 — SIGNIFICANT CHANGE UP (ref 5–8)
PLATELET # BLD AUTO: 308 K/UL — SIGNIFICANT CHANGE UP (ref 150–400)
POTASSIUM SERPL-MCNC: 4.4 MMOL/L — SIGNIFICANT CHANGE UP (ref 3.5–5.3)
POTASSIUM SERPL-SCNC: 4.4 MMOL/L — SIGNIFICANT CHANGE UP (ref 3.5–5.3)
PROT SERPL-MCNC: 7.3 G/DL — SIGNIFICANT CHANGE UP (ref 6–8.3)
PROT UR-MCNC: 15
RBC # BLD: 4.04 M/UL — SIGNIFICANT CHANGE UP (ref 3.8–5.2)
RBC # FLD: 13.2 % — SIGNIFICANT CHANGE UP (ref 10.3–14.5)
SODIUM SERPL-SCNC: 141 MMOL/L — SIGNIFICANT CHANGE UP (ref 135–145)
SP GR SPEC: 1.01 — SIGNIFICANT CHANGE UP (ref 1.01–1.02)
SPECIMEN SOURCE: SIGNIFICANT CHANGE UP
T PALLIDUM AB TITR SER: NEGATIVE — SIGNIFICANT CHANGE UP
UROBILINOGEN FLD QL: NEGATIVE — SIGNIFICANT CHANGE UP
WBC # BLD: 5.84 K/UL — SIGNIFICANT CHANGE UP (ref 3.8–10.5)
WBC # FLD AUTO: 5.84 K/UL — SIGNIFICANT CHANGE UP (ref 3.8–10.5)

## 2021-07-07 PROCEDURE — 99285 EMERGENCY DEPT VISIT HI MDM: CPT

## 2021-07-07 PROCEDURE — 84702 CHORIONIC GONADOTROPIN TEST: CPT

## 2021-07-07 PROCEDURE — 96375 TX/PRO/DX INJ NEW DRUG ADDON: CPT

## 2021-07-07 PROCEDURE — 76830 TRANSVAGINAL US NON-OB: CPT | Mod: 26

## 2021-07-07 PROCEDURE — 96374 THER/PROPH/DIAG INJ IV PUSH: CPT

## 2021-07-07 PROCEDURE — 36415 COLL VENOUS BLD VENIPUNCTURE: CPT

## 2021-07-07 PROCEDURE — 76830 TRANSVAGINAL US NON-OB: CPT

## 2021-07-07 PROCEDURE — 81001 URINALYSIS AUTO W/SCOPE: CPT

## 2021-07-07 PROCEDURE — 80053 COMPREHEN METABOLIC PANEL: CPT

## 2021-07-07 PROCEDURE — 99284 EMERGENCY DEPT VISIT MOD MDM: CPT | Mod: 25

## 2021-07-07 PROCEDURE — 85025 COMPLETE CBC W/AUTO DIFF WBC: CPT

## 2021-07-07 RX ORDER — SODIUM CHLORIDE 9 MG/ML
1000 INJECTION INTRAMUSCULAR; INTRAVENOUS; SUBCUTANEOUS ONCE
Refills: 0 | Status: COMPLETED | OUTPATIENT
Start: 2021-07-07 | End: 2021-07-07

## 2021-07-07 RX ORDER — KETOROLAC TROMETHAMINE 30 MG/ML
30 SYRINGE (ML) INJECTION ONCE
Refills: 0 | Status: DISCONTINUED | OUTPATIENT
Start: 2021-07-07 | End: 2021-07-07

## 2021-07-07 RX ORDER — ONDANSETRON 8 MG/1
4 TABLET, FILM COATED ORAL ONCE
Refills: 0 | Status: COMPLETED | OUTPATIENT
Start: 2021-07-07 | End: 2021-07-07

## 2021-07-07 RX ADMIN — ONDANSETRON 4 MILLIGRAM(S): 8 TABLET, FILM COATED ORAL at 13:52

## 2021-07-07 RX ADMIN — Medication 30 MILLIGRAM(S): at 15:09

## 2021-07-07 RX ADMIN — SODIUM CHLORIDE 1000 MILLILITER(S): 9 INJECTION INTRAMUSCULAR; INTRAVENOUS; SUBCUTANEOUS at 13:52

## 2021-07-07 NOTE — ED PROVIDER NOTE - ATTENDING CONTRIBUTION TO CARE
37 yo black female with Fibroid Uterus, seen at [facility] yesterday for same which is lower abdominal pains now with continued same pain, constant in nature. CT was negative for Appy yesterday and JEAN PIERRE revealed few large fibroids. Exam revealed obese black female in NAD with soft but non tender abdomen. I agree with plan and management outlined by PA.

## 2021-07-07 NOTE — ED PROVIDER NOTE - OBJECTIVE STATEMENT
37 y/o F with hx of fibroids, hx of R oophorectomy and salpingectomy presents with c/o pelvic pain x 3 days. Pt states that she started her menstruation 3 days ago and since has had pelvic pain. States that pain was worse yesterday, was seen at [facility] ED yesterday and had blood work done and TV US which showed several large fibroids, ct scan abdomen/pelvis yesterday negative for appendicitis, advised to take motrin without relief and f/u GYN clinic. States that she still has persistent pelvic pain however improved from yesterday. States that she has pain to her R pelvic region radiating to lower back. +nausea. Denies fever, V/D, dysuria.

## 2021-07-07 NOTE — ED PROVIDER NOTE - CLINICAL SUMMARY MEDICAL DECISION MAKING FREE TEXT BOX
37 y/o F with hx of fibroids, hx of R oophorectomy and salpingectomy presents with c/o pelvic pain x 3 days. Pt states that she started her menstruation 3 days ago and since has had pelvic pain. States that pain was worse yesterday, was seen at [facility] ED yesterday and had blood work done and TV US which showed several large fibroids, ct scan abdomen/pelvis yesterday negative for appendicitis, advised to take motrin without relief and f/u GYN clinic. States that she still has persistent pelvic pain however improved from yesterday. States that she has pain to her R pelvic region radiating to lower back. +nausea. Denies fever, V/D, dysuria. PE as above; will check labs - check H/H, UA, pregnancy test, repeat TVUS r/o torsion, IVF, pain meds, rx percocet and f/u GYN

## 2021-07-07 NOTE — ED PROVIDER NOTE - NSFOLLOWUPINSTRUCTIONS_ED_ALL_ED_FT
Follow up with your GYN in 1-2 days for re-evaluation, ongoing care and treatment. Rest, drink  plenty of fluids, take medications as prescribed. If having worsening of symptoms or other related symptoms, RETURN TO THE ER IMMEDIATELY.     Uterine Fibroids       Uterine fibroids are lumps of tissue (tumors) in your womb (uterus). They are not cancer (are benign).    Most women with this condition do not need treatment. Sometimes fibroids can affect your ability to have children (your fertility). If that happens, you may need surgery to take out the fibroids.      Follow these instructions at home:    •Take over-the-counter and prescription medicines only as told by your doctor. Your doctor may suggest NSAIDs (such as aspirin or ibuprofen) to help with pain.    •Ask your doctor if you should:  •Take iron pills.      •Eat more foods that have iron in them, such as dark green, leafy vegetables.      •If directed, apply heat to your back or belly to reduce pain. Use the heat source that your doctor recommends, such as a moist heat pack or a heating pad.  •Put a towel between your skin and the heat source.      •Leave the heat on for 20–30 minutes.      •Remove the heat if your skin turns bright red. This is especially important if you are unable to feel pain, heat, or cold. You may have a greater risk of getting burned.      •Pay close attention to your period (menstrual) cycles. Tell your doctor about any changes, such as:  •A heavier blood flow than usual.      •Needing to use more pads or tampons than normal.      •A change in how many days your period lasts.      •A change in symptoms that come with your period, such as cramps or back pain.        •Keep all follow-up visits as told by your doctor. This is important. Your doctor may need to watch your fibroids over time for any changes.        Contact a doctor if you:  •Have pain that does not get better with medicine or heat, such as pain or cramps in:  •Your back.      •The area between your hip bones (pelvic area).      •Your belly.        •Have new bleeding between your periods.      •Have more bleeding during or between your periods.      •Feel very tired or weak.      •Feel light-headed.        Get help right away if you:    •Pass out (faint).      •Have pain in the area between your hip bones that suddenly gets worse.      •Have bleeding that soaks a tampon or pad in 30 minutes or less.        Summary    •Uterine fibroids are lumps of tissue (tumors) in your womb (uterus). They are not cancer.      •The only treatment that most women need is taking aspirin or ibuprofen for pain.      •Contact a doctor if you have pain or cramps that do not get better with medicine.      •Make sure you know what symptoms you should get help for right away.      This information is not intended to replace advice given to you by your health care provider. Make sure you discuss any questions you have with your health care provider.

## 2021-07-07 NOTE — ED ADULT NURSE NOTE - CAS DISCH TRANSFER METHOD
Hey there, your labs look great, no changes at this time and recheck 3 months. The sugar and kidney functions have both improved and the cholesterol is in good range.  [employer]

## 2021-07-07 NOTE — ED PROVIDER NOTE - PATIENT PORTAL LINK FT
You can access the FollowMyHealth Patient Portal offered by [facility] by registering at the following website: [email or web address]. By joining [employer]’s FollowMyHealth portal, you will also be able to view your health information using other applications (apps) compatible with our system.

## 2021-07-07 NOTE — ED PROVIDER NOTE - CARE PROVIDER_API CALL
Jamie Carmichael  OBSTETRICS AND GYNECOLOGY  [address] [place] [address]  Phone: [phone]  Fax: [phone]  Follow Up Time: 1-3 Days

## 2021-07-07 NOTE — ED PROVIDER NOTE - NONTENDER LOCATION
Unable to leave message. Mailed results  
left upper quadrant/right upper quadrant/left lower quadrant/periumbilical/umbilical/suprapubic/left costovertebral angle/right costovertebral angle

## 2021-07-07 NOTE — ED ADULT NURSE NOTE - OBJECTIVE STATEMENT
Pt received in bed alert and oriented and resting in bed with the c/o pelvic pain. Pt states that she was seen yesterday and dx with fibroids and 2 left ovarian cyst. Pt states that she is having excruciating pain and discomfort. As per M's orders IV olivia placed blood specimen obtained and sent to the lab. Pt stable with IVF infusing well. Nursing care ongoing and safety maintained.

## 2021-07-12 ENCOUNTER — NON-APPOINTMENT (OUTPATIENT)
Age: 36
End: 2021-07-12

## 2021-07-12 ENCOUNTER — APPOINTMENT (OUTPATIENT)
Dept: OBGYN | Facility: CLINIC | Age: 36
End: 2021-07-12
Payer: MEDICAID

## 2021-07-12 PROCEDURE — 99385 PREV VISIT NEW AGE 18-39: CPT

## 2021-08-12 ENCOUNTER — APPOINTMENT (OUTPATIENT)
Dept: OBGYN | Facility: CLINIC | Age: 36
End: 2021-08-12

## 2023-08-16 ENCOUNTER — EMERGENCY (EMERGENCY)
Facility: HOSPITAL | Age: 38
LOS: 1 days | Discharge: HOME | End: 2023-08-16
Attending: EMERGENCY MEDICINE | Admitting: EMERGENCY MEDICINE
Payer: COMMERCIAL

## 2023-08-16 VITALS
WEIGHT: 238.1 LBS | SYSTOLIC BLOOD PRESSURE: 125 MMHG | HEART RATE: 67 BPM | DIASTOLIC BLOOD PRESSURE: 90 MMHG | RESPIRATION RATE: 20 BRPM | OXYGEN SATURATION: 98 % | HEIGHT: 64 IN | TEMPERATURE: 98 F

## 2023-08-16 DIAGNOSIS — Z90.79 ACQUIRED ABSENCE OF OTHER GENITAL ORGAN(S): Chronic | ICD-10-CM

## 2023-08-16 PROCEDURE — 99283 EMERGENCY DEPT VISIT LOW MDM: CPT

## 2023-08-16 PROCEDURE — 96372 THER/PROPH/DIAG INJ SC/IM: CPT

## 2023-08-16 PROCEDURE — 99284 EMERGENCY DEPT VISIT MOD MDM: CPT

## 2023-08-16 RX ORDER — KETOROLAC TROMETHAMINE 30 MG/ML
30 SYRINGE (ML) INJECTION ONCE
Refills: 0 | Status: DISCONTINUED | OUTPATIENT
Start: 2023-08-16 | End: 2023-08-16

## 2023-08-16 RX ORDER — DEXAMETHASONE 0.5 MG/5ML
10 ELIXIR ORAL ONCE
Refills: 0 | Status: COMPLETED | OUTPATIENT
Start: 2023-08-16 | End: 2023-08-16

## 2023-08-16 RX ORDER — ACETAMINOPHEN 500 MG
975 TABLET ORAL ONCE
Refills: 0 | Status: COMPLETED | OUTPATIENT
Start: 2023-08-16 | End: 2023-08-16

## 2023-08-16 RX ORDER — METHOCARBAMOL 500 MG/1
1500 TABLET, FILM COATED ORAL ONCE
Refills: 0 | Status: COMPLETED | OUTPATIENT
Start: 2023-08-16 | End: 2023-08-16

## 2023-08-16 RX ORDER — IBUPROFEN 200 MG
1 TABLET ORAL
Qty: 20 | Refills: 0
Start: 2023-08-16

## 2023-08-16 RX ORDER — DIAZEPAM 5 MG
5 TABLET ORAL ONCE
Refills: 0 | Status: DISCONTINUED | OUTPATIENT
Start: 2023-08-16 | End: 2023-08-16

## 2023-08-16 RX ORDER — DIAZEPAM 5 MG
1 TABLET ORAL
Qty: 9 | Refills: 0
Start: 2023-08-16

## 2023-08-16 RX ORDER — LIDOCAINE 4 G/100G
1 CREAM TOPICAL ONCE
Refills: 0 | Status: COMPLETED | OUTPATIENT
Start: 2023-08-16 | End: 2023-08-16

## 2023-08-16 RX ORDER — LIDOCAINE 4 G/100G
1 CREAM TOPICAL
Qty: 1 | Refills: 0
Start: 2023-08-16

## 2023-08-16 RX ADMIN — Medication 10 MILLIGRAM(S): at 21:58

## 2023-08-16 RX ADMIN — Medication 5 MILLIGRAM(S): at 21:58

## 2023-08-16 RX ADMIN — METHOCARBAMOL 1500 MILLIGRAM(S): 500 TABLET, FILM COATED ORAL at 20:03

## 2023-08-16 RX ADMIN — Medication 975 MILLIGRAM(S): at 20:03

## 2023-08-16 RX ADMIN — Medication 30 MILLIGRAM(S): at 20:03

## 2023-08-16 RX ADMIN — LIDOCAINE 1 PATCH: 4 CREAM TOPICAL at 20:03
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
## 2023-08-16 NOTE — ED PROVIDER NOTE - PATIENT PORTAL LINK FT
You can access the FollowMyHealth Patient Portal offered by [facility] by registering at the following website: [email or web address]. By joining [employer]’s FollowMyHealth portal, you will also be able to view your health information using other applications (apps) compatible with our system.

## 2023-08-16 NOTE — ED PROVIDER NOTE - PHYSICAL EXAMINATION
Gen: No acute distress, non toxic  HEENT: Mucous membranes moist, pink conjunctivae, PERRL, EOMI  CV: RRR, nl s1/s2.  Resp: CTAB, normal rate and effort  GI: Abdomen soft, NT, ND. No rebound, no guarding  : No CVAT  Neuro: A&O x 3, CNII-XII grossly intact, moving all 4 extremities  MSK: +Bilateral lumbar paraspinal tenderness. 5/5 strength BUE/BLE, sensation intact throughout, 2+ distal pulses  Skin: No rashes. intact and perfused.

## 2023-08-16 NOTE — ED ADULT NURSE NOTE - NSFALLUNIVINTERV_ED_ALL_ED
Bed/Stretcher in lowest position, wheels locked, appropriate side rails in place/Call bell, personal items and telephone in reach/Instruct patient to call for assistance before getting out of bed/chair/stretcher/Non-slip footwear applied when patient is off stretcher/[place] to call system/Physically safe environment - no spills, clutter or unnecessary equipment/Purposeful proactive rounding/Room/bathroom lighting operational, light cord in reach
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
## 2023-08-16 NOTE — ED PROVIDER NOTE - NS ED ATTENDING STATEMENT MOD
This was a shared visit with the MAMADOU. I reviewed and verified the documentation and independently performed the documented:
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
## 2023-08-23 ENCOUNTER — APPOINTMENT (OUTPATIENT)
Dept: ORTHOPEDIC SURGERY | Facility: CLINIC | Age: 38
End: 2023-08-23
Payer: COMMERCIAL

## 2023-08-23 VITALS — HEIGHT: 62 IN | BODY MASS INDEX: 50.42 KG/M2 | WEIGHT: 274 LBS

## 2023-08-23 DIAGNOSIS — Z78.9 OTHER SPECIFIED HEALTH STATUS: ICD-10-CM

## 2023-08-23 DIAGNOSIS — S39.012A STRAIN OF MUSCLE, FASCIA AND TENDON OF LOWER BACK, INITIAL ENCOUNTER: ICD-10-CM

## 2023-08-23 DIAGNOSIS — S16.1XXA STRAIN OF MUSCLE, FASCIA AND TENDON AT NECK LEVEL, INITIAL ENCOUNTER: ICD-10-CM

## 2023-08-23 PROBLEM — Z00.00 ENCOUNTER FOR PREVENTIVE HEALTH EXAMINATION: Status: ACTIVE | Noted: 2023-08-23

## 2023-08-23 PROCEDURE — 99204 OFFICE O/P NEW MOD 45 MIN: CPT

## 2023-09-06 ENCOUNTER — APPOINTMENT (OUTPATIENT)
Dept: ORTHOPEDIC SURGERY | Facility: CLINIC | Age: 38
End: 2023-09-06

## 2023-10-08 ENCOUNTER — NON-APPOINTMENT (OUTPATIENT)
Age: 38
End: 2023-10-08

## 2023-10-11 PROBLEM — Z78.9 OTHER SPECIFIED HEALTH STATUS: Chronic | Status: ACTIVE | Noted: 2023-08-16

## 2023-11-23 ENCOUNTER — NON-APPOINTMENT (OUTPATIENT)
Age: 38
End: 2023-11-23

## 2024-02-13 ENCOUNTER — TRANSCRIPTION ENCOUNTER (OUTPATIENT)
Age: 39
End: 2024-02-13

## 2024-02-13 ENCOUNTER — APPOINTMENT (OUTPATIENT)
Dept: NEUROLOGY | Facility: CLINIC | Age: 39
End: 2024-02-13
Payer: COMMERCIAL

## 2024-02-13 DIAGNOSIS — Z78.9 OTHER SPECIFIED HEALTH STATUS: ICD-10-CM

## 2024-02-13 DIAGNOSIS — Z82.0 FAMILY HISTORY OF EPILEPSY AND OTHER DISEASES OF THE NERVOUS SYSTEM: ICD-10-CM

## 2024-02-13 DIAGNOSIS — G44.89 OTHER HEADACHE SYNDROME: ICD-10-CM

## 2024-02-13 DIAGNOSIS — Z87.891 PERSONAL HISTORY OF NICOTINE DEPENDENCE: ICD-10-CM

## 2024-02-13 DIAGNOSIS — M54.16 RADICULOPATHY, LUMBAR REGION: ICD-10-CM

## 2024-02-13 PROCEDURE — 99204 OFFICE O/P NEW MOD 45 MIN: CPT | Mod: 95

## 2024-02-13 RX ORDER — GABAPENTIN 100 MG/1
CAPSULE ORAL
Refills: 0 | Status: ACTIVE | COMMUNITY

## 2024-02-13 RX ORDER — TRAMADOL HYDROCHLORIDE AND ACETAMINOPHEN 37.5; 325 MG/1; MG/1
37.5-325 TABLET, FILM COATED ORAL 4 TIMES DAILY
Qty: 28 | Refills: 0 | Status: COMPLETED | COMMUNITY
Start: 2023-08-23 | End: 2024-02-13

## 2024-02-13 RX ORDER — IBUPROFEN 200 MG/1
CAPSULE, LIQUID FILLED ORAL
Refills: 0 | Status: ACTIVE | COMMUNITY

## 2024-02-13 NOTE — REASON FOR VISIT
[Home] : at home, [unfilled] , at the time of the visit. [Medical Office: ([facility])___] : at the medical office located in  [Patient] : the patient [Consultation] : a consultation visit [FreeTextEntry1] : CC: headache and lower back pain to right leg

## 2024-02-13 NOTE — CONSULT LETTER
[Dear  ___] : Dear  [unfilled], [Courtesy Letter:] : I had the pleasure of seeing your patient, [unfilled], in my office today. [Please see my note below.] : Please see my note below. [Consult Closing:] : Thank you very much for allowing me to participate in the care of this patient.  If you have any questions, please do not hesitate to contact me. [Sincerely,] : Sincerely, [FreeTextEntry3] : Paulino Aj MD.
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
## 2024-02-13 NOTE — PHYSICAL EXAM
[FreeTextEntry1] : Examination: Examination is limited due to the nature of tele health visit.   The patient is well appearing and in no acute distress.   Neurological Examination:  Mental status: The patient is awake, alert, and oriented. Attention span seems normal. Recent and remote memory seem intact.  Cranial nerves: Extraocular motion is full. Face appears symmetric, Shoulder shrug is symmetric, tongue is midline.  Motor: There is no pronator drift. Fine finger movement is intact.  Sensory: Unable to test. Reflexes: Unable to test.  Cerebellar: No finger nose dysmetria.  Gait is grossly normal (within the confines of the tele health technology).   There is no edema seen.